# Patient Record
Sex: FEMALE | Race: WHITE | NOT HISPANIC OR LATINO | Employment: UNEMPLOYED | URBAN - METROPOLITAN AREA
[De-identification: names, ages, dates, MRNs, and addresses within clinical notes are randomized per-mention and may not be internally consistent; named-entity substitution may affect disease eponyms.]

---

## 2019-11-02 ENCOUNTER — TRANSFERRED RECORDS (OUTPATIENT)
Dept: PHYSICAL THERAPY | Facility: CLINIC | Age: 74
End: 2019-11-02

## 2021-03-17 ENCOUNTER — OFFICE VISIT - HEALTHEAST (OUTPATIENT)
Dept: RHEUMATOLOGY | Facility: CLINIC | Age: 76
End: 2021-03-17

## 2021-03-17 DIAGNOSIS — M19.90 INFLAMMATORY ARTHRITIS: ICD-10-CM

## 2021-03-17 DIAGNOSIS — M25.50 POLYARTHRALGIA: ICD-10-CM

## 2021-03-17 DIAGNOSIS — M15.9 GENERALIZED OSTEOARTHROSIS OF HAND: ICD-10-CM

## 2021-03-17 LAB
ALBUMIN SERPL-MCNC: 4.4 G/DL (ref 3.5–5)
ALT SERPL W P-5'-P-CCNC: 17 U/L (ref 0–45)
BASOPHILS # BLD AUTO: 0.1 THOU/UL (ref 0–0.2)
BASOPHILS NFR BLD AUTO: 1 % (ref 0–2)
C REACTIVE PROTEIN LHE: 0.2 MG/DL (ref 0–0.8)
CREAT SERPL-MCNC: 0.68 MG/DL (ref 0.6–1.1)
EOSINOPHIL # BLD AUTO: 0.2 THOU/UL (ref 0–0.4)
EOSINOPHIL NFR BLD AUTO: 4 % (ref 0–6)
ERYTHROCYTE [DISTWIDTH] IN BLOOD BY AUTOMATED COUNT: 13 % (ref 11–14.5)
ERYTHROCYTE [SEDIMENTATION RATE] IN BLOOD BY WESTERGREN METHOD: 7 MM/HR (ref 0–20)
GFR SERPL CREATININE-BSD FRML MDRD: >60 ML/MIN/1.73M2
HCT VFR BLD AUTO: 43 % (ref 35–47)
HGB BLD-MCNC: 14.5 G/DL (ref 12–16)
IMM GRANULOCYTES # BLD: 0 THOU/UL
IMM GRANULOCYTES NFR BLD: 1 %
LYMPHOCYTES # BLD AUTO: 1.3 THOU/UL (ref 0.8–4.4)
LYMPHOCYTES NFR BLD AUTO: 23 % (ref 20–40)
MCH RBC QN AUTO: 30.2 PG (ref 27–34)
MCHC RBC AUTO-ENTMCNC: 33.7 G/DL (ref 32–36)
MCV RBC AUTO: 90 FL (ref 80–100)
MONOCYTES # BLD AUTO: 0.5 THOU/UL (ref 0–0.9)
MONOCYTES NFR BLD AUTO: 9 % (ref 2–10)
NEUTROPHILS # BLD AUTO: 3.6 THOU/UL (ref 2–7.7)
NEUTROPHILS NFR BLD AUTO: 63 % (ref 50–70)
PLATELET # BLD AUTO: 221 THOU/UL (ref 140–440)
PMV BLD AUTO: 9.5 FL (ref 7–10)
RBC # BLD AUTO: 4.8 MILL/UL (ref 3.8–5.4)
RHEUMATOID FACT SERPL-ACNC: <15 IU/ML (ref 0–30)
URATE SERPL-MCNC: 5.6 MG/DL (ref 2–7.5)
WBC: 5.7 THOU/UL (ref 4–11)

## 2021-03-17 ASSESSMENT — MIFFLIN-ST. JEOR: SCORE: 1203.03

## 2021-03-18 LAB
CCP AB SER IA-ACNC: <0.5 U/ML
HCV AB SERPL QL IA: NEGATIVE

## 2021-03-19 LAB — ANA SER QL: 0.2 U

## 2021-04-05 ENCOUNTER — OFFICE VISIT - HEALTHEAST (OUTPATIENT)
Dept: RHEUMATOLOGY | Facility: CLINIC | Age: 76
End: 2021-04-05

## 2021-04-05 DIAGNOSIS — M15.9 GENERALIZED OSTEOARTHROSIS OF HAND: ICD-10-CM

## 2021-04-05 DIAGNOSIS — M19.90 INFLAMMATORY ARTHRITIS: ICD-10-CM

## 2021-04-05 RX ORDER — PREDNISONE 2.5 MG/1
TABLET ORAL
Qty: 90 TABLET | Refills: 2 | Status: SHIPPED | OUTPATIENT
Start: 2021-04-05

## 2021-05-30 ENCOUNTER — HEALTH MAINTENANCE LETTER (OUTPATIENT)
Age: 76
End: 2021-05-30

## 2021-06-05 VITALS
HEIGHT: 65 IN | HEART RATE: 76 BPM | SYSTOLIC BLOOD PRESSURE: 130 MMHG | BODY MASS INDEX: 26.16 KG/M2 | DIASTOLIC BLOOD PRESSURE: 64 MMHG | WEIGHT: 157 LBS

## 2021-06-16 NOTE — PROGRESS NOTES
Adelina Good is a 76 y.o. female who is being evaluated via a billable video visit.      How would you like to obtain your AVS? MyChart.  If dropped from the video visit, the video invitation should be resent by: Text to cell phone: [phone]  Will anyone else be joining your video visit? No      Video Start Time: 2:38 PM  Video-Visit Details    Type of service:  Video Visit    Video End Time (time video stopped): 2:45 PM  Originating Location (pt. Location): Home    Distant Location (provider location):  [facility]     Platform used for Video Visit: mPowa      This document was created using a software with less than 100% fidelity, at times resulting in unintended, even erroneous syntax and grammar.  The reader is advised to keep this under consideration while reviewing, interpreting this note.           ASSESSMENT AND PLAN:    Diagnoses and all orders for this visit:    Inflammatory arthritis  -     hydrOXYchloroQUINE (PLAQUENIL) 200 mg tablet; Take 1 tablet (200 mg total) by mouth 2 (two) times a day.  Dispense: 60 tablet; Refill: 6  -     predniSONE (DELTASONE) 2.5 MG tablet; Take 7.5 mg/d prednisone PO for 1 month, reduce to 5 mg/d for the next month, and then reduce to 2.5 mg/d for the third month and then stop..  Dispense: 90 tablet; Refill: 2    Generalized Osteoarthritis Of The Hand        Follow up in 3 months      HISTORY OF PRESENTING ILLNESS:  Adelina Good 76 y.o. is evaluated here via video/audio link.  This is for follow-up.  She has now evidence of inflammatory joint disease affecting her right hand/wrist, currently undifferentiated, seronegative, on prednisone at a significant dose, she also has osteoarthritis.  Today that we had a detailed discussion around these issues.  There is no personal or family history of psoriasis ulcerative colitis Crohn's disease.  .  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  She reports of prednisone  given in the first week of January was so helpful that it took away the symptoms quite rapidly.  She finished that course if she had recurrence and and and February she had took another truncated course of prednisone she had to stop it in view of her upcoming Covid 19 vaccination.  No other joint areas are similarly affected.  In the interim since her recent visit she has had another flareup of which she took 60 mg her prednisone.  I have advised against that massive dose.  We discussed options.  She agreed to start hydroxychloroquine major side effects including ocular dermatologic GI were discussed.  Low-dose prednisone as noted major side effects of those reviewed.  We will meet here in 3 months.       Rest of the review of systems pertinent to this conversation negative.   ROS enquiry held for fever, ocular symptoms, rash, headache,  GI issues.  Today we also discussed the issues related to the current pandemic, the pros and cons of the current treatment plan, the CDC guidelines such as social distancing washing the hands covering the cough.  ALLERGIES:Patient has no known allergies.    PAST MEDICAL/ACTIVE PROBLEMS/MEDICATION/SOCIAL DATA  No past medical history on file.  Social History     Tobacco Use   Smoking Status Never Smoker     Patient Active Problem List   Diagnosis     Lumbar Spondylosis     Skin: A Rash     Generalized Osteoarthritis Of The Hand     Pain in left wrist     Hand pain     Localized primary carpometacarpal osteoarthrosis     Current Outpatient Medications   Medication Sig Dispense Refill     atorvastatin (LIPITOR) 10 MG tablet 10 mg daily.       betamethasone dipropionate 0.05 % lotion External       budesonide-formoterol (SYMBICORT) 160-4.5 mcg/actuation inhaler Inhale 2 puffs 2 (two) times a day.       dexamethasone 0.5 mg/5 mL elixir        estradiol (VIVELLE-DOT) 0.0375 mg/24 hr 2 (two) times a week.       HYDROcodone-acetaminophen 5-325 mg per tablet 1-2 tablets. Every 4 to 6 hours  as needed       levothyroxine (SYNTHROID, LEVOTHROID) 50 MCG tablet 50 mcg daily.       methocarbamol (ROBAXIN) 750 MG tablet 1,500 mg 2 (two) times a day.       mometasone (NASONEX) 50 mcg/actuation nasal spray suspension       POLYETHYLENE GLYCOL 3350 (MIRALAX ORAL) powder       zolpidem (AMBIEN) 10 mg tablet Take 5 mg by mouth bedtime.       celecoxib (CELEBREX) 200 MG capsule Take 200 mg by mouth 2 (two) times a day.       cetirizine (ZYRTEC) 10 MG tablet        desonide (DESOWEN) 0.05 % cream External       No current facility-administered medications for this visit.          EXAMINATION:    Using the audio and video link as best as possible the constitutional, neck, neurologic, psych, skin, both upper extremities areas/organ system were evaluated during this assessment.  Some of the important findings: Alert, oriented, speech fluent.    Able to fully flex the digits, into fists bilaterally, wrist and elbow range of motion appear normal, abduction of the shoulder is normal.      LAB / IMAGING DATA:  ALT   Date Value Ref Range Status   03/17/2021 17 0 - 45 U/L Final   04/07/2014 27 12 - 78 U/L Final     Comment:     New ALT test method with new reference range as of 6/4/12     Albumin   Date Value Ref Range Status   03/17/2021 4.4 3.5 - 5.0 g/dL Final   04/07/2014 4.3 3.4 - 5.0 g/dL Final     Creatinine   Date Value Ref Range Status   03/17/2021 0.68 0.60 - 1.10 mg/dL Final   04/07/2014 0.7 0.60 - 1.30 mg/dL Final       WBC   Date Value Ref Range Status   03/17/2021 5.7 4.0 - 11.0 thou/uL Final   04/07/2014 5.4 4.0 - 11.0 thou/uL Final     Hemoglobin   Date Value Ref Range Status   03/17/2021 14.5 12.0 - 16.0 g/dL Final   04/07/2014 13.8 12.0 - 16.0 g/dL Final     Platelets   Date Value Ref Range Status   03/17/2021 221 140 - 440 thou/uL Final   04/07/2014 229 140 - 440 thou/uL Final       Lab Results   Component Value Date    RF <15.0 03/17/2021    SEDRATE 7 03/17/2021

## 2021-06-16 NOTE — PROGRESS NOTES
This document was created using a software with less than 100% fidelity, at times resulting in unintended, even erroneous syntax and grammar.  The reader is advised to keep this under consideration while reviewing, interpreting this note.    ASSESSMENT AND PLAN:  Adelina Good 76 y.o. female is seen here on 03/17/21 for evaluation of polyarthralgias, recent onset affecting her right hand, wrist.  While she is well known to have osteoarthritis her current presentation seems to be not necessarily a manifestation of OA symptoms.  This is discussed with her.  Despite having had prednisone on 2 occasions recently she still has residual inflammatory changes such as on the volar aspect of the right wrist.  Further work-up as noted.  Once these data are available we will meet together goals together and further course to be charted.  Diagnoses and all orders for this visit:    Polyarthralgia  -     HM1(CBC and Differential)  -     Creatinine  -     ALT (SGPT)  -     Albumin  -     Rheumatoid Factor Quant  -     CCP Antibodies  -     Hepatitis C Antibody (Anti-HCV)  -     Uric Acid  -     Erythrocyte Sedimentation Rate  -     C-Reactive Protein  -     Antinuclear Antibody (RENETTA) Cascade  -     XR Hands Bilateral 3 or More VWS; Future; Expected date: 03/17/2021  -     XR Hands Bilateral 3 or More VWS    Generalized Osteoarthritis Of The Hand  -     XR Hands Bilateral 3 or More VWS; Future; Expected date: 03/17/2021  -     XR Hands Bilateral 3 or More VWS    Inflammatory arthritis  -     C-Reactive Protein  -     Antinuclear Antibody (RENETTA) Cascade  -     XR Hands Bilateral 3 or More VWS; Future; Expected date: 03/17/2021  -     XR Hands Bilateral 3 or More VWS      HISTORY OF PRESENTING ILLNESS:  Adelina Good, 76 y.o., female is here for evaluation of pain in her right hand.  This is troubled her since fall 2020, this is progressed to a point where she had difficulty performing day-to-day activities by the time it was  November of last year.  She reports that the pain is generalized, but that she is able to point out to some of the joint such as the right first second MCP on the volar aspect of the wrist on the right side and also on the dorsum, she noticed swelling in these areas as well.  It reached a point where for example she had difficulty lifting her child that she is taking care of at home.  The left hand where she had injections for a first CMC osteoarthritis have not troubled her.  She has had no history of psoriasis ulcerative colitis Crohn's disease herself or the family.  She is not a smoker.  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  She reports of prednisone given in the first week of January was so helpful that it took away the symptoms quite rapidly.  She finished that course if she had recurrence and and and February she had took another truncated course of prednisone she had to stop it in view of her upcoming Covid 19 vaccination.  No other joint areas are similarly affected.  She rates the pain as a 6.0/10, severe in the right wrist, she has not had any fever weight loss blurry vision mouth ulcers photosensitivity rash.  There is no neuritic symptoms.  Rest of the review of systems pertinent to this conversation negative.    ALLERGIES:Patient has no known allergies.    PAST MEDICAL/ACTIVE PROBLEMS/MEDICATION/ FAMILY HISTORY/SOCIAL DATA:  The patient has a family history of  No past medical history on file.  Social History     Tobacco Use   Smoking Status Never Smoker     Patient Active Problem List   Diagnosis     Lumbar Spondylosis     Skin: A Rash     Generalized Osteoarthritis Of The Hand     Pain in left wrist     Hand pain     Localized primary carpometacarpal osteoarthrosis     Current Outpatient Medications   Medication Sig Dispense Refill     atorvastatin (LIPITOR) 10 MG tablet 10 mg daily.       betamethasone dipropionate 0.05 % lotion External       budesonide-formoterol  "(SYMBICORT) 160-4.5 mcg/actuation inhaler Inhale 2 puffs 2 (two) times a day.       celecoxib (CELEBREX) 200 MG capsule Take 200 mg by mouth 2 (two) times a day.       cetirizine (ZYRTEC) 10 MG tablet        desonide (DESOWEN) 0.05 % cream External       dexamethasone 0.5 mg/5 mL elixir        estradiol (VIVELLE-DOT) 0.0375 mg/24 hr 2 (two) times a week.       HYDROcodone-acetaminophen 5-325 mg per tablet 1-2 tablets. Every 4 to 6 hours as needed       levothyroxine (SYNTHROID, LEVOTHROID) 50 MCG tablet 50 mcg daily.       methocarbamol (ROBAXIN) 750 MG tablet 1,500 mg 2 (two) times a day.       mometasone (NASONEX) 50 mcg/actuation nasal spray suspension       POLYETHYLENE GLYCOL 3350 (MIRALAX ORAL) powder       zolpidem (AMBIEN) 10 mg tablet Take 5 mg by mouth bedtime.       No current facility-administered medications for this visit.        COMPREHENSIVE EXAMINATION:  Vitals:    03/17/21 1238   BP: 130/64   Pulse: 76   Weight: 157 lb (71.2 kg)   Height: 5' 5\" (1.651 m)     A well appearing alert oriented female. Well appearing alert oriented.   Examination of the eyes revealed no redness, obvious scleromalacia.  ENT examination shows no nasal deformity such as of saddle type, external ear without signs of inflamm/deformity ation.  Cardiopulmonary examination without obvious signs of dyspnea, no wheezing is audible, no cyanosis.  There is no finger clubbing.  Skin examination performed for heliotrope, malar area eruption periungual erythema, lupus pernio.  Neurological examination shows the speech is fluent, no facial asymmetry, muscle power in the upper extremities proximally appears to be normal.  In the psychiatric examination the memory, orientation, attention, affect were observable and normal.  Joint examination of the DIPs, PIPs, MCPs, IP joints of the thumbs, wrists, elbows, for swelling, range of motion, for shoulders range of motion evaluated.  She has swelling at the volar aspect of her right wrist more " so on the radial side but also is more subtle on the ulnar side, with tenderness, she has scattered tenderness that is the first MCP, and the dorsum of the wrist on the right side none on the left.  She does not have dactylitis of digits.  None of the rest of the palpable joints of upper extremities show evidence of synovitis, normal range of abduction at the shoulders.    LAB / IMAGING DATA:  ALT   Date Value Ref Range Status   04/07/2014 27 12 - 78 U/L Final     Comment:     New ALT test method with new reference range as of 6/4/12     Albumin   Date Value Ref Range Status   04/07/2014 4.3 3.4 - 5.0 g/dL Final     Creatinine   Date Value Ref Range Status   04/07/2014 0.7 0.60 - 1.30 mg/dL Final       WBC   Date Value Ref Range Status   04/07/2014 5.4 4.0 - 11.0 thou/uL Final     Hemoglobin   Date Value Ref Range Status   04/07/2014 13.8 12.0 - 16.0 g/dL Final     Platelets   Date Value Ref Range Status   04/07/2014 229 140 - 440 thou/uL Final       Lab Results   Component Value Date    RF <15.0 03/31/2015

## 2021-07-07 ENCOUNTER — MEDICAL CORRESPONDENCE (OUTPATIENT)
Dept: HEALTH INFORMATION MANAGEMENT | Facility: CLINIC | Age: 76
End: 2021-07-07

## 2021-09-19 ENCOUNTER — HEALTH MAINTENANCE LETTER (OUTPATIENT)
Age: 76
End: 2021-09-19

## 2021-11-16 ENCOUNTER — OFFICE VISIT (OUTPATIENT)
Dept: RHEUMATOLOGY | Facility: CLINIC | Age: 76
End: 2021-11-16
Payer: COMMERCIAL

## 2021-11-16 ENCOUNTER — LAB (OUTPATIENT)
Dept: LAB | Facility: CLINIC | Age: 76
End: 2021-11-16

## 2021-11-16 VITALS
BODY MASS INDEX: 25.63 KG/M2 | HEART RATE: 72 BPM | WEIGHT: 154 LBS | DIASTOLIC BLOOD PRESSURE: 64 MMHG | SYSTOLIC BLOOD PRESSURE: 110 MMHG

## 2021-11-16 DIAGNOSIS — M15.9 GENERALIZED OSTEOARTHROSIS OF HAND: ICD-10-CM

## 2021-11-16 DIAGNOSIS — Z79.899 HIGH RISK MEDICATION USE: ICD-10-CM

## 2021-11-16 DIAGNOSIS — M19.90 INFLAMMATORY ARTHRITIS: Primary | ICD-10-CM

## 2021-11-16 DIAGNOSIS — M25.50 POLYARTHRALGIA: ICD-10-CM

## 2021-11-16 DIAGNOSIS — M19.90 INFLAMMATORY ARTHRITIS: ICD-10-CM

## 2021-11-16 LAB
ALBUMIN SERPL-MCNC: 4.5 G/DL (ref 3.5–5)
ALT SERPL W P-5'-P-CCNC: 17 U/L (ref 0–45)
CREAT SERPL-MCNC: 0.8 MG/DL (ref 0.6–1.1)
ERYTHROCYTE [DISTWIDTH] IN BLOOD BY AUTOMATED COUNT: 12.8 % (ref 10–15)
GFR SERPL CREATININE-BSD FRML MDRD: 72 ML/MIN/1.73M2
HCT VFR BLD AUTO: 42.6 % (ref 35–47)
HGB BLD-MCNC: 14 G/DL (ref 11.7–15.7)
MCH RBC QN AUTO: 30.7 PG (ref 26.5–33)
MCHC RBC AUTO-ENTMCNC: 32.9 G/DL (ref 31.5–36.5)
MCV RBC AUTO: 93 FL (ref 78–100)
PLATELET # BLD AUTO: 201 10E3/UL (ref 150–450)
RBC # BLD AUTO: 4.56 10E6/UL (ref 3.8–5.2)
WBC # BLD AUTO: 6 10E3/UL (ref 4–11)

## 2021-11-16 PROCEDURE — 36415 COLL VENOUS BLD VENIPUNCTURE: CPT

## 2021-11-16 PROCEDURE — 99214 OFFICE O/P EST MOD 30 MIN: CPT | Performed by: INTERNAL MEDICINE

## 2021-11-16 PROCEDURE — 82040 ASSAY OF SERUM ALBUMIN: CPT

## 2021-11-16 PROCEDURE — 82565 ASSAY OF CREATININE: CPT

## 2021-11-16 PROCEDURE — 85027 COMPLETE CBC AUTOMATED: CPT

## 2021-11-16 PROCEDURE — 84460 ALANINE AMINO (ALT) (SGPT): CPT

## 2021-11-16 RX ORDER — HYDROXYCHLOROQUINE SULFATE 200 MG/1
200 TABLET, FILM COATED ORAL 2 TIMES DAILY
Qty: 180 TABLET | Refills: 1 | Status: SHIPPED | OUTPATIENT
Start: 2021-11-16 | End: 2022-02-14

## 2021-11-16 NOTE — PROGRESS NOTES
Rheumatology follow-up visit note     Adelina is a 76 year old female presents today for follow-up.    Adelina was seen today for recheck.    Diagnoses and all orders for this visit:    Inflammatory arthritis  -     hydroxychloroquine (PLAQUENIL) 200 MG tablet; Take 1 tablet (200 mg) by mouth 2 times daily  -     ALT; Future  -     Albumin level; Future  -     CBC with platelets; Future  -     Creatinine; Future    Generalized osteoarthrosis of hand    Polyarthralgia    High risk medication use  -     ALT; Future  -     Albumin level; Future  -     CBC with platelets; Future  -     Creatinine; Future    Her inflammatory polyarthritis is well controlled with hydroxychloroquine, management intervals of OA reviewed, eye examination reiterated, labs as noted.      Follow up in 6 months.    HPI      inflammatory joint disease, osteoarthritis, which is seronegative, has done very well with hydroxychloroquine, noted no flareup of pain able to do most of her day-to-day activities without any or with some difficulty, she noted morning stiffness of 30 minutes.  She is no longer on prednisone.   There is no personal or family history of psoriasis ulcerative colitis Crohn's disease.  .  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  She reports of prednisone given in the first week of January was so helpful that it took away the symptoms quite rapidly.  She finished that course if she had recurrence and and and February she had took another truncated course of prednisone she had to stop it in view of her upcoming Covid 19 vaccination.  No other joint areas are similarly affected. She has noted a bump on her right toe and wonders if that is a sign of hydroxychloroquine not being effective.   Rest of the review of systems pertinent to this conversation negative.   ROS enquiry held for fever, ocular symptoms, rash, headache,  GI issues.  Today we also discussed the issues related to the current  pandemic, the pros and cons of the current treatment plan, the CDC guidelines such as social distancing washing the hands covering the cough.  ALLERGIES:Patient has no known allergies.  DETAILED EXAMINATION  11/16/21  :    Vitals:    11/16/21 1021   BP: 110/64   Pulse: 72   Weight: 69.9 kg (154 lb)     Alert oriented. Head including the face is examined for malar rash, heliotropes, scarring, lupus pernio. Eyes examined for redness such as in episcleritis/scleritis, periorbital lesions.   Neck/ Face examined for parotid gland swelling, range of motion of neck.  Left upper and lower and right upper and lower extremities examined for tenderness, swelling, warmth of the appendicular joints, range of motion, edema, rash.  Some of the important findings included: she does not have evidence of synovitis in the palpable joints of the upper extremities.  No significant deformities of the digits.  + [place]ber[place] nodes.  Range of motion of the shoulders  show full abduction.  No JLT effusion or warmth of the knees.  she does not have dactylitis of the digits.  On her right second toe on the PIP area she has a small cystic lesion not present on the opposite number.     Patient Active Problem List    Diagnosis Date Noted     iamLUMB/LUMBOSAC DISC DEGEN 06/15/2006     Priority: Medium     No past surgical history on file.   No past medical history on file.  No Known Allergies  Current Outpatient Medications   Medication Sig Dispense Refill     atorvastatin (LIPITOR) 10 MG tablet [ATORVASTATIN (LIPITOR) 10 MG TABLET] 10 mg daily.       betamethasone dipropionate 0.05 % lotion [BETAMETHASONE DIPROPIONATE 0.05 % LOTION] External       budesonide-formoterol (SYMBICORT) 160-4.5 mcg/actuation inhaler [BUDESONIDE-FORMOTEROL (SYMBICORT) 160-4.5 MCG/ACTUATION INHALER] Inhale 2 puffs 2 (two) times a day.       cetirizine (ZYRTEC) 10 MG tablet [CETIRIZINE (ZYRTEC) 10 MG TABLET]        dexamethasone 0.5 mg/5 mL elixir [DEXAMETHASONE 0.5 MG/5 ML  ELIXIR]        estradiol (VIVELLE-DOT) 0.0375 mg/24 hr [ESTRADIOL (VIVELLE-DOT) 0.0375 MG/24 HR] 2 (two) times a week.       HYDROcodone-acetaminophen 5-325 mg per tablet [HYDROCODONE-ACETAMINOPHEN 5-325 MG PER TABLET] 1-2 tablets. Every 4 to 6 hours as needed       levothyroxine (SYNTHROID, LEVOTHROID) 50 MCG tablet [LEVOTHYROXINE (SYNTHROID, LEVOTHROID) 50 MCG TABLET] 50 mcg daily.       methocarbamol (ROBAXIN) 750 MG tablet [METHOCARBAMOL (ROBAXIN) 750 MG TABLET] 1,500 mg 2 (two) times a day.       POLYETHYLENE GLYCOL 3350 (MIRALAX ORAL) [POLYETHYLENE GLYCOL 3350 (MIRALAX ORAL)] powder       predniSONE (DELTASONE) 2.5 MG tablet [PREDNISONE (DELTASONE) 2.5 MG TABLET] Take 7.5 mg/d prednisone PO for 1 month, reduce to 5 mg/d for the next month, and then reduce to 2.5 mg/d for the third month and then stop.. 90 tablet 2     zolpidem (AMBIEN) 10 mg tablet [ZOLPIDEM (AMBIEN) 10 MG TABLET] Take 5 mg by mouth bedtime.       celecoxib (CELEBREX) 200 MG capsule [CELECOXIB (CELEBREX) 200 MG CAPSULE] Take 200 mg by mouth 2 (two) times a day.       desonide (DESOWEN) 0.05 % cream [DESONIDE (DESOWEN) 0.05 % CREAM] External       mometasone (NASONEX) 50 mcg/actuation nasal spray [MOMETASONE (NASONEX) 50 MCG/ACTUATION NASAL SPRAY] suspension       family history is not on file.  Social Connections: Not on file          WBC   Date Value Ref Range Status   03/17/2021 5.7 4.0 - 11.0 thou/uL Final     RBC Count   Date Value Ref Range Status   03/17/2021 4.80 3.80 - 5.40 mill/uL Final     Hemoglobin   Date Value Ref Range Status   03/17/2021 14.5 12.0 - 16.0 g/dL Final     Hematocrit   Date Value Ref Range Status   03/17/2021 43.0 35.0 - 47.0 % Final     MCV   Date Value Ref Range Status   03/17/2021 90 80 - 100 fL Final     MCH   Date Value Ref Range Status   03/17/2021 30.2 27.0 - 34.0 pg Final     Platelet Count   Date Value Ref Range Status   03/17/2021 221 140 - 440 thou/uL Final     % Lymphocytes   Date Value Ref Range Status    03/17/2021 23 20 - 40 % Final     ALT   Date Value Ref Range Status   03/17/2021 17 0 - 45 U/L Final     Albumin   Date Value Ref Range Status   03/17/2021 4.4 3.5 - 5.0 g/dL Final     Creatinine   Date Value Ref Range Status   03/17/2021 0.68 0.60 - 1.10 mg/dL Final     GFR Estimate   Date Value Ref Range Status   03/17/2021 >60 >60 mL/min/1.73m2 Final     GFR Estimate If Black   Date Value Ref Range Status   03/17/2021 >60 >60 mL/min/1.73m2 Final     Erythrocyte Sedimentation Rate   Date Value Ref Range Status   03/17/2021 7 0 - 20 mm/hr Final     CRP   Date Value Ref Range Status   03/17/2021 0.2 0.0 - 0.8 mg/dL Final

## 2022-01-07 DIAGNOSIS — M19.90 INFLAMMATORY ARTHRITIS: ICD-10-CM

## 2022-01-07 RX ORDER — HYDROXYCHLOROQUINE SULFATE 200 MG/1
TABLET, FILM COATED ORAL
Qty: 180 TABLET | Refills: 0 | OUTPATIENT
Start: 2022-01-07

## 2022-01-17 ENCOUNTER — TELEPHONE (OUTPATIENT)
Dept: RHEUMATOLOGY | Facility: CLINIC | Age: 77
End: 2022-01-17
Payer: COMMERCIAL

## 2022-01-17 NOTE — TELEPHONE ENCOUNTER
Pt is wondering if hydroxychloroquine can cause hair loss. Pt states she has lost 50% of hair since she started taking this. Pt would like to talk to someone about this. Please call pt back [phone].

## 2022-01-17 NOTE — TELEPHONE ENCOUNTER
Diagnoses and all orders for this visit:     Inflammatory arthritis  -     hydroxychloroquine (PLAQUENIL) 200 MG tablet; Take 1 tablet (200 mg) by mouth 2 times daily     Generalized osteoarthrosis of hand     Polyarthralgia     High risk medication use     Her inflammatory polyarthritis is well controlled with hydroxychloroquine, management intervals of OA reviewed, eye examination reiterated, labs as noted.        LOV:11/16/21  FOV:5/3/22      Labs:11/16/21 WNL

## 2022-01-18 NOTE — TELEPHONE ENCOUNTER
Per  hydroxychloroquine can cause hair loss,  Patient can stop medication or reduce the dosage in half.    Pt informed and before deciding what to do would like to know why this medication was chosen.

## 2022-01-19 NOTE — TELEPHONE ENCOUNTER
Left message for pt.     chose this medication because it is the safest. All other medication require frequent monitoring.

## 2022-02-22 ENCOUNTER — OFFICE VISIT (OUTPATIENT)
Dept: RHEUMATOLOGY | Facility: CLINIC | Age: 77
End: 2022-02-22
Payer: COMMERCIAL

## 2022-02-22 VITALS
HEIGHT: 65 IN | DIASTOLIC BLOOD PRESSURE: 60 MMHG | HEART RATE: 60 BPM | WEIGHT: 154 LBS | SYSTOLIC BLOOD PRESSURE: 130 MMHG | BODY MASS INDEX: 25.66 KG/M2

## 2022-02-22 DIAGNOSIS — M19.90 INFLAMMATORY ARTHRITIS: Primary | ICD-10-CM

## 2022-02-22 DIAGNOSIS — M25.50 POLYARTHRALGIA: ICD-10-CM

## 2022-02-22 DIAGNOSIS — M15.9 GENERALIZED OSTEOARTHROSIS OF HAND: ICD-10-CM

## 2022-02-22 PROCEDURE — 99214 OFFICE O/P EST MOD 30 MIN: CPT | Performed by: INTERNAL MEDICINE

## 2022-02-22 NOTE — PROGRESS NOTES
Rheumatology follow-up visit note     Adelina is a 76 year old female presents today for follow-up.    Adelina was seen today for recheck.    Diagnoses and all orders for this visit:    Inflammatory arthritis    Generalized osteoarthrosis of hand    Polyarthralgia        This patient with arthralgia in association with osteoarthritis, inflammatory joint disease, with history of lumbar spondylosis, had stopped taking hydroxychloroquine after she noted hair loss that has stabilized since stopping it.  While the key questions today we discussed with her that we should look into starting an alternative such as methotrexate pros and cons of each were reviewed, she is more inclined to see how things evolve over the next few months.  We will meet here in 3 months.    Follow up in 3 months.    HPI    Adelina Good is a 76 year old female is here for follow-up of inflammatory joint disease, osteoarthritis, which is seronegative, has done very well with hydroxychloroquine, noted no flareup of pain able to do most of her day-to-day activities without any or with some difficulty, she noted morning stiffness of 30 minutes.  After her previous visit she noted worsening hair loss.  She called here.  I asked her to stop taking hydroxychloroquine.  Today she looking back does not think that could have been another alternative etiology unusual stress to explain why she was losing hair.  Since stopping hydroxychloroquine she feels that her hair loss is stabilized.  She has noticed some discomfort in her wrists.  She takes care of her grandchildren.  She would like to continue to do that.   There is no personal or family history of psoriasis ulcerative colitis Crohn's disease.  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  She reports of prednisone given in the first week of January was so helpful that it took away the symptoms quite rapidly.  She finished that course if she had recurrence and and  "and February she had took another truncated course of prednisone she had to stop it in view of her upcoming Covid 19 vaccination.  No other joint areas are similarly affected. She has noted a bump on her right toe and wonders if that is a sign of hydroxychloroquine not being effective.       DETAILED EXAMINATION  02/22/22  :    Vitals:    02/22/22 1136   BP: 130/60   BP Location: Right arm   Patient Position: Sitting   Cuff Size: Adult Regular   Pulse: 60   Weight: 69.9 kg (154 lb)   Height: 1.651 m (5' 5\")     Alert oriented. Head including the face is examined for malar rash, heliotropes, scarring, lupus pernio. Eyes examined for redness such as in episcleritis/scleritis, periorbital lesions.   Neck/ Face examined for parotid gland swelling, range of motion of neck.  Left upper and lower and right upper and lower extremities examined for tenderness, swelling, warmth of the appendicular joints, range of motion, edema, rash.  Some of the important findings included: she does not have evidence of synovitis in the palpable joints of the upper extremities.  No significant deformities of the digits.  NO Heberden nodes.  Range of motion of the shoulders  show full abduction.  No JLT effusion or warmth of the knees.  she does not have dactylitis of the digits.     Patient Active Problem List    Diagnosis Date Noted     iamLUMB/LUMBOSAC DISC DEGEN 06/15/2006     Priority: Medium     No past surgical history on file.   No past medical history on file.  No Known Allergies  Current Outpatient Medications   Medication Sig Dispense Refill     atorvastatin (LIPITOR) 10 MG tablet [ATORVASTATIN (LIPITOR) 10 MG TABLET] 10 mg daily.       betamethasone dipropionate 0.05 % lotion [BETAMETHASONE DIPROPIONATE 0.05 % LOTION] External       budesonide-formoterol (SYMBICORT) 160-4.5 mcg/actuation inhaler [BUDESONIDE-FORMOTEROL (SYMBICORT) 160-4.5 MCG/ACTUATION INHALER] Inhale 2 puffs 2 (two) times a day.       cetirizine (ZYRTEC) 10 MG " tablet [CETIRIZINE (ZYRTEC) 10 MG TABLET]        dexamethasone 0.5 mg/5 mL elixir [DEXAMETHASONE 0.5 MG/5 ML ELIXIR]        estradiol (VIVELLE-DOT) 0.0375 mg/24 hr [ESTRADIOL (VIVELLE-DOT) 0.0375 MG/24 HR] 2 (two) times a week.       HYDROcodone-acetaminophen 5-325 mg per tablet [HYDROCODONE-ACETAMINOPHEN 5-325 MG PER TABLET] 1-2 tablets. Every 4 to 6 hours as needed       levothyroxine (SYNTHROID, LEVOTHROID) 50 MCG tablet [LEVOTHYROXINE (SYNTHROID, LEVOTHROID) 50 MCG TABLET] 50 mcg daily.       methocarbamol (ROBAXIN) 750 MG tablet [METHOCARBAMOL (ROBAXIN) 750 MG TABLET] 1,500 mg 2 (two) times a day.       POLYETHYLENE GLYCOL 3350 (MIRALAX ORAL) [POLYETHYLENE GLYCOL 3350 (MIRALAX ORAL)] powder       zolpidem (AMBIEN) 10 mg tablet [ZOLPIDEM (AMBIEN) 10 MG TABLET] Take 5 mg by mouth bedtime.       celecoxib (CELEBREX) 200 MG capsule [CELECOXIB (CELEBREX) 200 MG CAPSULE] Take 200 mg by mouth 2 (two) times a day. (Patient not taking: Reported on 2/22/2022)       desonide (DESOWEN) 0.05 % cream [DESONIDE (DESOWEN) 0.05 % CREAM] External (Patient not taking: Reported on 2/22/2022)       hydroxychloroquine (PLAQUENIL) 200 MG tablet Take 1 tablet (200 mg) by mouth 2 times daily (Patient not taking: Reported on 2/22/2022) 180 tablet 1     mometasone (NASONEX) 50 mcg/actuation nasal spray [MOMETASONE (NASONEX) 50 MCG/ACTUATION NASAL SPRAY] suspension (Patient not taking: Reported on 2/22/2022)       predniSONE (DELTASONE) 2.5 MG tablet [PREDNISONE (DELTASONE) 2.5 MG TABLET] Take 7.5 mg/d prednisone PO for 1 month, reduce to 5 mg/d for the next month, and then reduce to 2.5 mg/d for the third month and then stop.. (Patient not taking: Reported on 2/22/2022) 90 tablet 2     family history is not on file.  Social Connections: Not on file          WBC Count   Date Value Ref Range Status   11/16/2021 6.0 4.0 - 11.0 10e3/uL Final     RBC Count   Date Value Ref Range Status   11/16/2021 4.56 3.80 - 5.20 10e6/uL Final      Hemoglobin   Date Value Ref Range Status   11/16/2021 14.0 11.7 - 15.7 g/dL Final     Hematocrit   Date Value Ref Range Status   11/16/2021 42.6 35.0 - 47.0 % Final     MCV   Date Value Ref Range Status   11/16/2021 93 78 - 100 fL Final     MCH   Date Value Ref Range Status   11/16/2021 30.7 26.5 - 33.0 pg Final     Platelet Count   Date Value Ref Range Status   11/16/2021 201 150 - 450 10e3/uL Final     % Lymphocytes   Date Value Ref Range Status   03/17/2021 23 20 - 40 % Final     ALT   Date Value Ref Range Status   11/16/2021 17 0 - 45 U/L Final     Albumin   Date Value Ref Range Status   11/16/2021 4.5 3.5 - 5.0 g/dL Final     Creatinine   Date Value Ref Range Status   11/16/2021 0.80 0.60 - 1.10 mg/dL Final     GFR Estimate   Date Value Ref Range Status   11/16/2021 72 >60 mL/min/1.73m2 Final     Comment:     As of July 11, 2021, eGFR is calculated by the CKD-EPI creatinine equation, without race adjustment. eGFR can be influenced by muscle mass, exercise, and diet. The reported eGFR is an estimation only and is only applicable if the renal function is stable.   03/17/2021 >60 >60 mL/min/1.73m2 Final     GFR Estimate If Black   Date Value Ref Range Status   03/17/2021 >60 >60 mL/min/1.73m2 Final     Erythrocyte Sedimentation Rate   Date Value Ref Range Status   03/17/2021 7 0 - 20 mm/hr Final     CRP   Date Value Ref Range Status   03/17/2021 0.2 0.0 - 0.8 mg/dL Final

## 2022-05-24 ENCOUNTER — OFFICE VISIT (OUTPATIENT)
Dept: RHEUMATOLOGY | Facility: CLINIC | Age: 77
End: 2022-05-24
Payer: COMMERCIAL

## 2022-05-24 VITALS
DIASTOLIC BLOOD PRESSURE: 60 MMHG | BODY MASS INDEX: 25.66 KG/M2 | HEIGHT: 65 IN | SYSTOLIC BLOOD PRESSURE: 112 MMHG | HEART RATE: 64 BPM | WEIGHT: 154 LBS

## 2022-05-24 DIAGNOSIS — M54.50 CHRONIC BILATERAL LOW BACK PAIN WITHOUT SCIATICA: ICD-10-CM

## 2022-05-24 DIAGNOSIS — M25.50 POLYARTHRALGIA: ICD-10-CM

## 2022-05-24 DIAGNOSIS — G89.29 CHRONIC BILATERAL LOW BACK PAIN WITHOUT SCIATICA: ICD-10-CM

## 2022-05-24 DIAGNOSIS — M15.9 GENERALIZED OSTEOARTHROSIS OF HAND: Primary | ICD-10-CM

## 2022-05-24 PROCEDURE — 99214 OFFICE O/P EST MOD 30 MIN: CPT | Performed by: INTERNAL MEDICINE

## 2022-05-24 NOTE — PROGRESS NOTES
Rheumatology follow-up visit note     Adelina is a 77 year old female presents today for follow-up.    Adelina was seen today for recheck.    Diagnoses and all orders for this visit:    Generalized osteoarthrosis of hand    Polyarthralgia    Chronic bilateral low back pain without sciatica        This patient with polyarthralgia has her pain emanating most likely from degenerative joint disease both axial and appendicular, the likelihood that she has residual inflammatory joint disease appears to be remote there is no indication for her to be taking methotrexate which was a consideration during her recent visit, she is already off hydroxychloroquine.  She is going to consider duloxetine.  She will follow-up here in 6 months.    Follow up in 6 months.    HPI    Adelina Good is a 77 year old female is here for follow-up.  She has polyarthralgias affecting multiple joints, she is also had low back pain which appears to be the worst of her symptoms, in the recent past she had features suggestive of inflammatory joint disease, hydroxychloroquine was started that helped the joints however she had hair loss this was discontinued on previous visit.  Since then she has noted worsening pain in her lower back.  She has had discomfort in her ankles hands, she points to the bases of the thumbs and not so in the PIPs or MCPs, lifting as little as 4 pounds can be hard.  She has not observed swelling in her joint areas.She takes care of her grandchildren, which is becoming more difficult for her to continue.  She would like to continue to do that.   There is no personal or family history of psoriasis ulcerative colitis Crohn's disease.  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  She reports of prednisone given in the first week of January was so helpful that it took away the symptoms quite rapidly.  She finished that course if she had recurrence and and and February she had took another  "truncated course of prednisone she had to stop it in view of her upcoming Covid 19 vaccination.  No other joint areas are similarly affected. She has noted a bump on her right toe and wonders if that is a sign of hydroxychloroquine not being effective.      DETAILED EXAMINATION  05/24/22  :    Vitals:    05/24/22 1238   BP: 112/60   BP Location: Right arm   Patient Position: Sitting   Cuff Size: Adult Regular   Pulse: 64   Weight: 69.9 kg (154 lb)   Height: 1.651 m (5' 5\")     Alert oriented. Head including the face is examined for malar rash, heliotropes, scarring, lupus pernio. Eyes examined for redness such as in episcleritis/scleritis, periorbital lesions.   Neck/ Face examined for parotid gland swelling, range of motion of neck.  Left upper and lower and right upper and lower extremities examined for tenderness, swelling, warmth of the appendicular joints, range of motion, edema, rash.  Some of the important findings included: she does not have evidence of synovitis in the palpable joints of the upper extremities.  No significant deformities of the digits.  Small Heberden nodes.  Range of motion of the shoulders  show full abduction.  No JLT effusion or warmth of the knees.  she does not have dactylitis of the digits.  There is no synovitis or tenderness in the tibiotalar subtalar joint range of motion there is full and pain-free.  She does not have enthesopathy such as at the Achilles insertion.  There is no dactylitis of digits or toes.     Patient Active Problem List    Diagnosis Date Noted     iamLUMB/LUMBOSAC DISC DEGEN 06/15/2006     Priority: Medium     No past surgical history on file.   No past medical history on file.  No Known Allergies  Current Outpatient Medications   Medication Sig Dispense Refill     atorvastatin (LIPITOR) 10 MG tablet [ATORVASTATIN (LIPITOR) 10 MG TABLET] 10 mg daily.       betamethasone dipropionate 0.05 % lotion [BETAMETHASONE DIPROPIONATE 0.05 % LOTION] External       " budesonide-formoterol (SYMBICORT) 160-4.5 mcg/actuation inhaler [BUDESONIDE-FORMOTEROL (SYMBICORT) 160-4.5 MCG/ACTUATION INHALER] Inhale 2 puffs 2 (two) times a day.       cetirizine (ZYRTEC) 10 MG tablet [CETIRIZINE (ZYRTEC) 10 MG TABLET]        dexamethasone 0.5 mg/5 mL elixir [DEXAMETHASONE 0.5 MG/5 ML ELIXIR]        estradiol (VIVELLE-DOT) 0.0375 mg/24 hr [ESTRADIOL (VIVELLE-DOT) 0.0375 MG/24 HR] 2 (two) times a week.       HYDROcodone-acetaminophen 5-325 mg per tablet [HYDROCODONE-ACETAMINOPHEN 5-325 MG PER TABLET] 1-2 tablets. Every 4 to 6 hours as needed       levothyroxine (SYNTHROID, LEVOTHROID) 50 MCG tablet [LEVOTHYROXINE (SYNTHROID, LEVOTHROID) 50 MCG TABLET] 50 mcg daily.       methocarbamol (ROBAXIN) 750 MG tablet [METHOCARBAMOL (ROBAXIN) 750 MG TABLET] 1,500 mg 2 (two) times a day.       POLYETHYLENE GLYCOL 3350 (MIRALAX ORAL) [POLYETHYLENE GLYCOL 3350 (MIRALAX ORAL)] powder       zolpidem (AMBIEN) 10 mg tablet [ZOLPIDEM (AMBIEN) 10 MG TABLET] Take 5 mg by mouth bedtime.       celecoxib (CELEBREX) 200 MG capsule [CELECOXIB (CELEBREX) 200 MG CAPSULE] Take 200 mg by mouth 2 (two) times a day. (Patient not taking: No sig reported)       desonide (DESOWEN) 0.05 % cream [DESONIDE (DESOWEN) 0.05 % CREAM] External (Patient not taking: No sig reported)       hydroxychloroquine (PLAQUENIL) 200 MG tablet Take 1 tablet (200 mg) by mouth 2 times daily (Patient not taking: No sig reported) 180 tablet 1     mometasone (NASONEX) 50 mcg/actuation nasal spray [MOMETASONE (NASONEX) 50 MCG/ACTUATION NASAL SPRAY] suspension (Patient not taking: No sig reported)       predniSONE (DELTASONE) 2.5 MG tablet [PREDNISONE (DELTASONE) 2.5 MG TABLET] Take 7.5 mg/d prednisone PO for 1 month, reduce to 5 mg/d for the next month, and then reduce to 2.5 mg/d for the third month and then stop.. (Patient not taking: No sig reported) 90 tablet 2     family history is not on file.  Social Connections: Not on file          WBC Count    Date Value Ref Range Status   11/16/2021 6.0 4.0 - 11.0 10e3/uL Final     RBC Count   Date Value Ref Range Status   11/16/2021 4.56 3.80 - 5.20 10e6/uL Final     Hemoglobin   Date Value Ref Range Status   11/16/2021 14.0 11.7 - 15.7 g/dL Final     Hematocrit   Date Value Ref Range Status   11/16/2021 42.6 35.0 - 47.0 % Final     MCV   Date Value Ref Range Status   11/16/2021 93 78 - 100 fL Final     MCH   Date Value Ref Range Status   11/16/2021 30.7 26.5 - 33.0 pg Final     Platelet Count   Date Value Ref Range Status   11/16/2021 201 150 - 450 10e3/uL Final     % Lymphocytes   Date Value Ref Range Status   03/17/2021 23 20 - 40 % Final     ALT   Date Value Ref Range Status   11/16/2021 17 0 - 45 U/L Final     Albumin   Date Value Ref Range Status   11/16/2021 4.5 3.5 - 5.0 g/dL Final     Creatinine   Date Value Ref Range Status   11/16/2021 0.80 0.60 - 1.10 mg/dL Final     GFR Estimate   Date Value Ref Range Status   11/16/2021 72 >60 mL/min/1.73m2 Final     Comment:     As of July 11, 2021, eGFR is calculated by the CKD-EPI creatinine equation, without race adjustment. eGFR can be influenced by muscle mass, exercise, and diet. The reported eGFR is an estimation only and is only applicable if the renal function is stable.   03/17/2021 >60 >60 mL/min/1.73m2 Final     GFR Estimate If Black   Date Value Ref Range Status   03/17/2021 >60 >60 mL/min/1.73m2 Final     Erythrocyte Sedimentation Rate   Date Value Ref Range Status   03/17/2021 7 0 - 20 mm/hr Final     CRP   Date Value Ref Range Status   03/17/2021 0.2 0.0 - 0.8 mg/dL Final

## 2022-06-02 ENCOUNTER — TRANSCRIBE ORDERS (OUTPATIENT)
Dept: OTHER | Age: 77
End: 2022-06-02

## 2022-06-02 DIAGNOSIS — M54.50 LOW BACK PAIN, UNSPECIFIED: Primary | ICD-10-CM

## 2022-06-26 ENCOUNTER — HEALTH MAINTENANCE LETTER (OUTPATIENT)
Age: 77
End: 2022-06-26

## 2022-11-20 ENCOUNTER — HEALTH MAINTENANCE LETTER (OUTPATIENT)
Age: 77
End: 2022-11-20

## 2023-07-08 ENCOUNTER — HEALTH MAINTENANCE LETTER (OUTPATIENT)
Age: 78
End: 2023-07-08

## 2023-08-22 ENCOUNTER — TRANSCRIBE ORDERS (OUTPATIENT)
Dept: OTHER | Age: 78
End: 2023-08-22

## 2023-08-22 DIAGNOSIS — M25.512 PAIN OF LEFT SHOULDER JOINT ON MOVEMENT: Primary | ICD-10-CM

## 2024-08-31 ENCOUNTER — HEALTH MAINTENANCE LETTER (OUTPATIENT)
Age: 79
End: 2024-08-31